# Patient Record
(demographics unavailable — no encounter records)

---

## 2025-03-29 NOTE — PHYSICAL EXAM
[Clear Rhinorrhea] : clear rhinorrhea [Erythematous Oropharynx] : erythematous oropharynx [NL] : warm, clear [Enlarged Tonsils] : tonsils not enlarged

## 2025-03-29 NOTE — DISCUSSION/SUMMARY
[FreeTextEntry1] : 8 year old female presenting with fever x3 days. On exam, afebrile and playful child in no acute distress. Rapid strep and flu negative. Throat culture pending. Likely viral syndrome  -Recommend supportive care -Call/RTC if new/worsening/persistent symptoms for re-evaluation -Mother reports child was diagnosed with a tongue tie in [place] somewhere and requesting ENT referral, provided

## 2025-03-29 NOTE — HISTORY OF PRESENT ILLNESS
[de-identified] : Fever [FreeTextEntry6] : 8 year old female presenting with 3 days of fevers. Tmax of 101F noted at home. Associated with nasal congestion, cough, NBNB vomiting and soft stools. Taking PO well.  Mother reports she also sometimes reports headaches.  (+) Sore throat.  No ear pain, abdominal pain reported. No dysuria.

## 2025-07-23 NOTE — CONSULT LETTER
[Dear  ___] : Dear  [unfilled], [Consult Letter:] : I had the pleasure of evaluating your patient, [unfilled]. [Please see my note below.] : Please see my note below. [Consult Closing:] : Thank you very much for allowing me to participate in the care of this patient.  If you have any questions, please do not hesitate to contact me. [Sincerely,] : Sincerely, [FreeTextEntry3] : Oidlia Kay MD Department of Dermatology [facility]

## 2025-07-23 NOTE — PHYSICAL EXAM
[FreeTextEntry3] : broad patch of short hairs on occipital scalp  no black dots +perifollicular grey halos +scale no occipital LAD

## 2025-07-23 NOTE — ASSESSMENT
[FreeTextEntry1] : alopecia  new diagnosis of uncertain prognosis favor tinea capitis ddx less likely [facility] vs other  Therapeutic options and their risks and benefits; along with multiple diagnostic possibilities were discussed at length; risks and benefits of further study were discussed bx deferred at this time, mom prefers to trial empiric tx f/u fungal cx start fluconazole 4.5 mL daily x 6 weeks, SED  RTC 6 weeks

## 2025-07-23 NOTE — CONSULT LETTER
[Dear  ___] : Dear  [unfilled], [Consult Letter:] : I had the pleasure of evaluating your patient, [unfilled]. [Please see my note below.] : Please see my note below. [Consult Closing:] : Thank you very much for allowing me to participate in the care of this patient.  If you have any questions, please do not hesitate to contact me. [Sincerely,] : Sincerely, [FreeTextEntry3] : Odilia Kay MD Department of Dermatology [facility]

## 2025-07-23 NOTE — HISTORY OF PRESENT ILLNESS
[FreeTextEntry1] : np rash [de-identified] : Referred by: Dr. Pereira   Ms. JOSEY PANDEY  is a 9 year old F here for evaluation of below  #hair breakage, patch, and bumps in scalp x 2 mo, pruritic.   no personal or family h/o skin cancer

## 2025-07-23 NOTE — HISTORY OF PRESENT ILLNESS
[FreeTextEntry1] : np rash [de-identified] : Referred by: Dr. Pereira   Ms. JOSEY PANDEY  is a 9 year old F here for evaluation of below  #hair breakage, patch, and bumps in scalp x 2 mo, pruritic.   no personal or family h/o skin cancer